# Patient Record
Sex: FEMALE | Race: WHITE | URBAN - METROPOLITAN AREA
[De-identification: names, ages, dates, MRNs, and addresses within clinical notes are randomized per-mention and may not be internally consistent; named-entity substitution may affect disease eponyms.]

---

## 2017-02-28 ENCOUNTER — OFFICE VISIT (OUTPATIENT)
Dept: ORTHOPEDICS | Facility: OTHER | Age: 82
End: 2017-02-28
Attending: ORTHOPAEDIC SURGERY
Payer: COMMERCIAL

## 2017-02-28 VITALS
HEART RATE: 64 BPM | SYSTOLIC BLOOD PRESSURE: 104 MMHG | BODY MASS INDEX: 22.29 KG/M2 | DIASTOLIC BLOOD PRESSURE: 68 MMHG | TEMPERATURE: 96.7 F | RESPIRATION RATE: 18 BRPM | HEIGHT: 58 IN | OXYGEN SATURATION: 98 % | WEIGHT: 106.2 LBS

## 2017-02-28 DIAGNOSIS — M17.11 PRIMARY OSTEOARTHRITIS OF RIGHT KNEE: Primary | ICD-10-CM

## 2017-02-28 PROCEDURE — 99212 OFFICE O/P EST SF 10 MIN: CPT | Performed by: ORTHOPAEDIC SURGERY

## 2017-02-28 PROCEDURE — 99212 OFFICE O/P EST SF 10 MIN: CPT

## 2017-02-28 ASSESSMENT — PAIN SCALES - GENERAL: PAINLEVEL: NO PAIN (0)

## 2017-02-28 NOTE — PROGRESS NOTES
Chief complaint: Severe DJD right knee    Subjective: This 81-year-old retired female has severe DJD of both knees worse and severely symptomatic on the right, but minimally symptomatically on the left.  Up to now she has wished to avoid surgery, if possible.  We therefore have been treating her conservatively with quarterly intra-articular steroid injections in the right knee, and OTC naproxen.  Her last injection was 3 months ago.  Today she reports that it only gave her about a week of pain relief.  She is having increasing difficulty getting up from a seated position.  She lives alone.  She denies a recent injury to the right knee.    She has a history of mental status changes with anesthesia and with pain medication.  Her last major surgery, performed in [place], resulted in prolonged mental status changes and she was almost transferred to an Alzheimer's unit before she started to clear.  Although she lives alone, she has 1 daughter nearby who states that the whole situation was extremely stressful on both her mother and herself and that she does not feel that she can go through another ordeal like that with her mother.    Examination: Elderly female with appropriate mood and affect.  The right knee may have a mild effusion.  It is tender to palpation along both joint lines. It was with great difficulty that she was able to get up from a seated position    Xx-rays: The last plain films on her right  Knee were performed on 8/4/16    Impression: Severe DJD right knee.    Plan: We discussed repeating the steroid injection but it seems futile given its prior efficacy.  We discussed a wheelchair, but the patient said she wouldn't use it. We also discussed the possibility of total knee arthroplasty, perhaps done under a spinal anesthetic, with inpatient rehabilitation afterwards.  She would still be at risk of mental status changes, however, from the postoperative pain medication.  The patient indicated that she has  changed her mind about avoiding surgery and would be interested in having the surgery done.  Her daughter admits that the injections are not helping but is very fearful about the prolonged mental status changes that may occur.  They agreed to hold off on injection today, sense a  TKA cannot be performed within 6 months of a steroid injection, and attend the next joint replacement class to learn more about the procedure.  I will see her back after the class to discuss the possibility of surgery further.

## 2017-02-28 NOTE — NURSING NOTE
"Chief Complaint   Patient presents with     Musculoskeletal Problem     Right knee pain       Initial /68 (BP Location: Right arm, Patient Position: Chair, Cuff Size: Adult Regular)  Pulse 64  Temp 96.7  F (35.9  C) (Tympanic)  Resp 18  Ht 4' 10\" (1.473 m)  Wt 106 lb 3.2 oz (48.2 kg)  SpO2 98%  BMI 22.2 kg/m2 Estimated body mass index is 22.2 kg/(m^2) as calculated from the following:    Height as of this encounter: 4' 10\" (1.473 m).    Weight as of this encounter: 106 lb 3.2 oz (48.2 kg).  Medication Reconciliation: unable or not appropriate to perform   Gosia Amaya LPN      "

## 2017-02-28 NOTE — MR AVS SNAPSHOT
"              After Visit Summary   2/28/2017    Shivani Ramírez    MRN: [id number]           Patient Information     Date Of Birth          1935        Visit Information        Provider Department      2/28/2017 11:20 AM Claudio Elizabeth MD [facility]        Today's Diagnoses     Primary osteoarthritis of right knee    -  1      Care Instructions    Attend the joint replacement class the [facility] on March 15        Follow-ups after your visit        Follow-up notes from your care team     Return in about 3 weeks (around 3/21/2017).      Your next 10 appointments already scheduled     Mar 21, 2017  9:20 AM CDT   (Arrive by 9:05 AM)   Return Visit with Claudio Elizabeth MD   [facility] ([facility] )    [address] [address] [phone]              Who to contact     If you have questions or need follow up information about today's clinic visit or your schedule please contact [facility] directly at [phone].  Normal or non-critical lab and imaging results will be communicated to you by MyChart, letter or phone within 4 business days after the clinic has received the results. If you do not hear from us within 7 days, please contact the clinic through [employer] Exchange[employer] or phone. If you have a critical or abnormal lab result, we will notify you by phone as soon as possible.  Submit refill requests through [employer] or call your pharmacy and they will forward the refill request to us. Please allow 3 business days for your refill to be completed.          Additional Information About Your Visit        Axial Exchangehart Information     [employer] lets you send messages to your doctor, view your test results, renew your prescriptions, schedule appointments and more. To sign up, go to [email or web address] . Click on \"Log in\" on the left side of the screen, which will take you to the Welcome page. Then click on \"Sign up Now\" on the right " "side of the page.     You will be asked to enter the access code listed below, as well as some personal information. Please follow the directions to create your username and password.     Your access code is: [email or web address]  Expires: 2017 12:32 PM     Your access code will  in 90 days. If you need help or a new code, please call your [facility] or [phone].        Care EveryWhere ID     This is your Care EveryWhere ID. This could be used by other organizations to access your [place] medical records  [id number]        Your Vitals Were     Pulse Temperature Respirations Height Pulse Oximetry BMI (Body Mass Index)    64 96.7  F (35.9  C) (Tympanic) 18 4' 10\" (1.473 m) 98% 22.2 kg/m2       Blood Pressure from Last 3 Encounters:   17 104/68   16 150/68   16 115/62    Weight from Last 3 Encounters:   17 106 lb 3.2 oz (48.2 kg)   16 103 lb (46.7 kg)   16 103 lb (46.7 kg)              Today, you had the following     No orders found for display       Primary Care Provider    [facility] Other Clinic                Thank you!     Thank you for choosing [facility]  for your care. Our goal is always to provide you with excellent care. Hearing back from our patients is one way we can continue to improve our services. Please take a few minutes to complete the written survey that you may receive in the mail after your visit with us. Thank you!             Your Updated Medication List - Protect others around you: Learn how to safely use, store and throw away your medicines at www.disposemymeds.org.          This list is accurate as of: 17 12:32 PM.  Always use your most recent med list.                   Brand Name Dispense Instructions for use    acetaminophen 325 MG tablet    TYLENOL     Take 650 mg by mouth       ASPIRIN PO      Take 162 mg by mouth daily       HYDROcodone-acetaminophen 5-325 MG per tablet    NORCO    30 tablet    Take 1 tablet by mouth every 4 " hours as needed for other (Moderate to Severe Pain)       IBUPROFEN PO      Take 200 mg by mouth 3 times daily as needed for moderate pain 3 Tablets, three times daily as needed       LISINOPRIL PO      Take 10 mg by mouth daily       LOPRESSOR PO      Take 25 mg by mouth 2 times daily Patient states is not sure of dose       meclizine 25 MG Chew      25 mg       metoprolol 25 MG 24 hr tablet    TOPROL-XL     Take 25 mg by mouth       multivitamin, therapeutic with minerals Tabs tablet      Take 1 tablet by mouth daily       OMEGA-3 FATTY ACIDS PO      Take 1,000 mg by mouth daily       OSTEO BI-FLEX JOINT SHIELD Tabs          PEPCID PO      Take 20 mg by mouth 2 times daily

## 2017-03-21 ENCOUNTER — OFFICE VISIT (OUTPATIENT)
Dept: ORTHOPEDICS | Facility: OTHER | Age: 82
End: 2017-03-21
Attending: ORTHOPAEDIC SURGERY
Payer: COMMERCIAL

## 2017-03-21 VITALS
HEIGHT: 58 IN | OXYGEN SATURATION: 98 % | RESPIRATION RATE: 18 BRPM | TEMPERATURE: 97.3 F | SYSTOLIC BLOOD PRESSURE: 128 MMHG | DIASTOLIC BLOOD PRESSURE: 68 MMHG | BODY MASS INDEX: 21.41 KG/M2 | WEIGHT: 102 LBS | HEART RATE: 62 BPM

## 2017-03-21 DIAGNOSIS — M17.11 PRIMARY OSTEOARTHRITIS OF RIGHT KNEE: Primary | ICD-10-CM

## 2017-03-21 DIAGNOSIS — Z01.810 PRE-OPERATIVE CARDIOVASCULAR EXAMINATION: ICD-10-CM

## 2017-03-21 LAB
ALBUMIN SERPL-MCNC: 3.9 G/DL (ref 3.4–5)
ALBUMIN UR-MCNC: NEGATIVE MG/DL
ALP SERPL-CCNC: 84 U/L (ref 40–150)
ALT SERPL W P-5'-P-CCNC: 21 U/L (ref 0–50)
ANION GAP SERPL CALCULATED.3IONS-SCNC: 7 MMOL/L (ref 3–14)
APPEARANCE UR: CLEAR
AST SERPL W P-5'-P-CCNC: 19 U/L (ref 0–45)
BASOPHILS # BLD AUTO: 0 10E9/L (ref 0–0.2)
BASOPHILS NFR BLD AUTO: 0.6 %
BILIRUB SERPL-MCNC: 0.5 MG/DL (ref 0.2–1.3)
BILIRUB UR QL STRIP: NEGATIVE
BUN SERPL-MCNC: 17 MG/DL (ref 7–30)
CALCIUM SERPL-MCNC: 10 MG/DL (ref 8.5–10.1)
CHLORIDE SERPL-SCNC: 103 MMOL/L (ref 94–109)
CO2 SERPL-SCNC: 29 MMOL/L (ref 20–32)
COLOR UR AUTO: YELLOW
CREAT SERPL-MCNC: 0.87 MG/DL (ref 0.52–1.04)
DIFFERENTIAL METHOD BLD: ABNORMAL
EOSINOPHIL # BLD AUTO: 0.1 10E9/L (ref 0–0.7)
EOSINOPHIL NFR BLD AUTO: 1 %
ERYTHROCYTE [DISTWIDTH] IN BLOOD BY AUTOMATED COUNT: 12.9 % (ref 10–15)
GFR SERPL CREATININE-BSD FRML MDRD: 63 ML/MIN/1.7M2
GLUCOSE SERPL-MCNC: 81 MG/DL (ref 70–99)
GLUCOSE UR STRIP-MCNC: NEGATIVE MG/DL
HCT VFR BLD AUTO: 38.3 % (ref 35–47)
HGB BLD-MCNC: 12.9 G/DL (ref 11.7–15.7)
HGB UR QL STRIP: ABNORMAL
KETONES UR STRIP-MCNC: NEGATIVE MG/DL
LEUKOCYTE ESTERASE UR QL STRIP: ABNORMAL
LYMPHOCYTES # BLD AUTO: 1.4 10E9/L (ref 0.8–5.3)
LYMPHOCYTES NFR BLD AUTO: 27.3 %
MCH RBC QN AUTO: 28.9 PG (ref 26.5–33)
MCHC RBC AUTO-ENTMCNC: 33.7 G/DL (ref 31.5–36.5)
MCV RBC AUTO: 86 FL (ref 78–100)
MONOCYTES # BLD AUTO: 0.4 10E9/L (ref 0–1.3)
MONOCYTES NFR BLD AUTO: 8.6 %
NEUTROPHILS # BLD AUTO: 3.1 10E9/L (ref 1.6–8.3)
NEUTROPHILS NFR BLD AUTO: 62.5 %
NITRATE UR QL: NEGATIVE
PH UR STRIP: 6.5 PH (ref 5–7)
PLATELET # BLD AUTO: 97 10E9/L (ref 150–450)
POTASSIUM SERPL-SCNC: 5 MMOL/L (ref 3.4–5.3)
PROT SERPL-MCNC: 7.1 G/DL (ref 6.8–8.8)
RBC # BLD AUTO: 4.47 10E12/L (ref 3.8–5.2)
RBC #/AREA URNS AUTO: NORMAL /HPF (ref 0–2)
SODIUM SERPL-SCNC: 139 MMOL/L (ref 133–144)
SP GR UR STRIP: 1.01 (ref 1–1.03)
URN SPEC COLLECT METH UR: ABNORMAL
UROBILINOGEN UR STRIP-ACNC: 0.2 EU/DL (ref 0.2–1)
WBC # BLD AUTO: 5 10E9/L (ref 4–11)
WBC #/AREA URNS AUTO: NORMAL /HPF (ref 0–2)

## 2017-03-21 PROCEDURE — 85025 COMPLETE CBC W/AUTO DIFF WBC: CPT | Performed by: ORTHOPAEDIC SURGERY

## 2017-03-21 PROCEDURE — 81001 URINALYSIS AUTO W/SCOPE: CPT | Performed by: ORTHOPAEDIC SURGERY

## 2017-03-21 PROCEDURE — 87086 URINE CULTURE/COLONY COUNT: CPT | Performed by: ORTHOPAEDIC SURGERY

## 2017-03-21 PROCEDURE — 87081 CULTURE SCREEN ONLY: CPT | Mod: 59 | Performed by: ORTHOPAEDIC SURGERY

## 2017-03-21 PROCEDURE — 80053 COMPREHEN METABOLIC PANEL: CPT | Performed by: ORTHOPAEDIC SURGERY

## 2017-03-21 PROCEDURE — 36415 COLL VENOUS BLD VENIPUNCTURE: CPT | Performed by: ORTHOPAEDIC SURGERY

## 2017-03-21 PROCEDURE — 99212 OFFICE O/P EST SF 10 MIN: CPT

## 2017-03-21 PROCEDURE — 99213 OFFICE O/P EST LOW 20 MIN: CPT | Performed by: ORTHOPAEDIC SURGERY

## 2017-03-21 ASSESSMENT — PAIN SCALES - GENERAL: PAINLEVEL: MODERATE PAIN (5)

## 2017-03-21 NOTE — MR AVS SNAPSHOT
"              After Visit Summary   3/21/2017    Shivani Ramírez    MRN: [id number]           Patient Information     Date Of Birth          1935        Visit Information        Provider Department      3/21/2017 9:20 AM Claudio Elizabeth MD [facility]        Today's Diagnoses     Primary osteoarthritis of right knee    -  1    Pre-operative cardiovascular examination           Follow-ups after your visit        Follow-up notes from your care team     Return in about 4 weeks (around 4/18/2017).      Your next 10 appointments already scheduled     May 09, 2017  9:00 AM CDT   (Arrive by 8:45 AM)   Return Visit with Claudio Elizabeth MD   [facility] ([facility] )    [address] [address] [phone]              Who to contact     If you have questions or need follow up information about today's clinic visit or your schedule please contact [facility] directly at [phone].  Normal or non-critical lab and imaging results will be communicated to you by Modacruzhart, letter or phone within 4 business days after the clinic has received the results. If you do not hear from us within 7 days, please contact the clinic through myMedScoret or phone. If you have a critical or abnormal lab result, we will notify you by phone as soon as possible.  Submit refill requests through [employer] or call your pharmacy and they will forward the refill request to us. Please allow 3 business days for your refill to be completed.          Additional Information About Your Visit        MyChart Information     [employer] lets you send messages to your doctor, view your test results, renew your prescriptions, schedule appointments and more. To sign up, go to [email or web address] . Click on \"Log in\" on the left side of the screen, which will take you to the Welcome page. Then click on \"Sign up Now\" on the right side of the page.     You will be asked to " "enter the access code listed below, as well as some personal information. Please follow the directions to create your username and password.     Your access code is: [email or web address]  Expires: 2017  1:32 PM     Your access code will  in 90 days. If you need help or a new code, please call your [place] clinic or [phone].        Care EveryWhere ID     This is your Care EveryWhere ID. This could be used by other organizations to access your [place] medical records  [id number]        Your Vitals Were     Pulse Temperature Respirations Height Pulse Oximetry BMI (Body Mass Index)    62 97.3  F (36.3  C) (Tympanic) 18 4' 10\" (1.473 m) 98% 21.32 kg/m2       Blood Pressure from Last 3 Encounters:   17 128/68   17 104/68   16 150/68    Weight from Last 3 Encounters:   17 102 lb (46.3 kg)   17 106 lb 3.2 oz (48.2 kg)   16 103 lb (46.7 kg)              We Performed the Following     *UA reflex to Microscopic and Culture     CBC with platelets and differential     Comprehensive metabolic panel     Methicillin resistant staph aureus cult     Urine Culture Aerobic Bacterial        Primary Care Provider    Md Other Clinic                Thank you!     Thank you for choosing [facility]  for your care. Our goal is always to provide you with excellent care. Hearing back from our patients is one way we can continue to improve our services. Please take a few minutes to complete the written survey that you may receive in the mail after your visit with us. Thank you!             Your Updated Medication List - Protect others around you: Learn how to safely use, store and throw away your medicines at www.disposemymeds.org.          This list is accurate as of: 3/21/17 10:24 AM.  Always use your most recent med list.                   Brand Name Dispense Instructions for use    acetaminophen 325 MG tablet    TYLENOL     Take 650 mg by mouth       ASPIRIN PO      Take 162 mg by " mouth daily       HYDROcodone-acetaminophen 5-325 MG per tablet    NORCO    30 tablet    Take 1 tablet by mouth every 4 hours as needed for other (Moderate to Severe Pain)       IBUPROFEN PO      Take 200 mg by mouth 3 times daily as needed for moderate pain 3 Tablets, three times daily as needed       LISINOPRIL PO      Take 10 mg by mouth daily       LOPRESSOR PO      Take 25 mg by mouth 2 times daily Patient states is not sure of dose       meclizine 25 MG Chew      25 mg       metoprolol 25 MG 24 hr tablet    TOPROL-XL     Take 25 mg by mouth       multivitamin, therapeutic with minerals Tabs tablet      Take 1 tablet by mouth daily       OMEGA-3 FATTY ACIDS PO      Take 1,000 mg by mouth daily       OSTEO BI-FLEX JOINT SHIELD Tabs          PEPCID PO      Take 20 mg by mouth 2 times daily

## 2017-03-21 NOTE — PROGRESS NOTES
Chief complaint: Severe DJD  Right knee    Subjective: This 81-year-old retired female has severe DJD of both knees, severely symptomatic on the right and minimally symptomatic on the left.  Conservative treatment up to now ( which exceeds 3 months) has consisted of oral NSAIDs (which no longer work) and quarterly steroid injections.  Her last injection was on 11/8/16 and did not help.  Her right knee pain interferes with activities of daily living such as the distance she can walk and the number stairs she can climb.  She has started using a cane in her home.  She has been trying to avoid surgery but has changed her mind and is now willing to consider TKA.  Her big concern is that she has a history of mental status changes with general anesthesia, which take a long time to clear.  She has less trouble with narcotic pain medication.  She therefore wishes to have her surgery done under a spinal anesthetic, then use as little postoperative narcotics as possible.  Since seen last on 2/28/17 she has attended our total joint class.  She found it very informative.  Based on what she learned at the class she wishes to proceed with planning a right TKA under spinal anesthesia.    Nothing has changed with respect to her musculoskeletal or neurologic review of systems since seen last.    Examination: Elderly female with appropriate mood and affect.  The right knee has no effusion.  It is tender along both joint lines.  Range of motion was 0-119  with pain at both extremes.  The knee is stable to ligamentous stressing.  The neurovascular status of that limb is intact.    Labs: Today her H&H is 12.9 and 38.3.  A UA is negative. BUN 17, Creatinine 0.87  ALT 21  AST 19  Albumen 3.9   Total Protein 7.1    Impression: Severe DJD right knee, failure of conservative treatment.    Plan: We will plan a TKA in may, once it has been 6 months since her last steroid injection.  She will see her PCP in April for preoperative medical  clearance.  I will see her after that for consent signing and scheduling a surgery date.     CC: Richelle Kent MD, [place], [place]

## 2017-03-21 NOTE — NURSING NOTE
"Chief Complaint   Patient presents with     Musculoskeletal Problem     right knee pain       Initial /68 (BP Location: Right arm, Patient Position: Chair, Cuff Size: Adult Regular)  Pulse 62  Temp 97.3  F (36.3  C) (Tympanic)  Resp 18  Ht 4' 10\" (1.473 m)  Wt 102 lb (46.3 kg)  SpO2 98%  BMI 21.32 kg/m2 Estimated body mass index is 21.32 kg/(m^2) as calculated from the following:    Height as of this encounter: 4' 10\" (1.473 m).    Weight as of this encounter: 102 lb (46.3 kg).  Medication Reconciliation: unable or not appropriate to perform   Gosia Amaya LPN      "

## 2017-03-23 LAB
BACTERIA SPEC CULT: NORMAL
MICRO REPORT STATUS: NORMAL
SPECIMEN SOURCE: NORMAL

## 2017-03-24 LAB
BACTERIA SPEC CULT: ABNORMAL
MICRO REPORT STATUS: ABNORMAL
SPECIMEN SOURCE: ABNORMAL

## 2017-05-01 ENCOUNTER — TRANSFERRED RECORDS (OUTPATIENT)
Dept: HEALTH INFORMATION MANAGEMENT | Facility: HOSPITAL | Age: 82
End: 2017-05-01

## 2017-05-03 ENCOUNTER — HOSPITAL ENCOUNTER (INPATIENT)
Facility: HOSPITAL | Age: 82
Setting detail: SURGERY ADMIT
End: 2017-05-03
Attending: ORTHOPAEDIC SURGERY | Admitting: ORTHOPAEDIC SURGERY
Payer: COMMERCIAL

## 2017-05-03 DIAGNOSIS — M25.561 CHRONIC PAIN OF RIGHT KNEE: Primary | ICD-10-CM

## 2017-05-03 DIAGNOSIS — G89.29 CHRONIC PAIN OF RIGHT KNEE: Primary | ICD-10-CM

## 2017-05-03 DIAGNOSIS — M17.11 ARTHRITIS OF RIGHT KNEE: Primary | ICD-10-CM

## 2017-05-09 ENCOUNTER — TELEPHONE (OUTPATIENT)
Dept: ORTHOPEDICS | Facility: OTHER | Age: 82
End: 2017-05-09

## 2017-05-09 NOTE — TELEPHONE ENCOUNTER
"Writer reached out to patient due to not being here for appointment today and patient stated \" I thought it was on Thursday\" patient advised writer that she is feeling better and she is walking better and that her daughter just had surgery and was unable to bring her to clinic today and that she will have to go and help take care of her, patient was offered an appointment on Thursday and patient declined, writer advised patient that I will take her off surgery schedule and to contact our office when she feels ready to continue with surgery, Patient advised she was cleared for surgery and pre-op appointments for scheduled surgery's are only good for 30 days. Patient appeared to understand and continued to state \" I am walking better and feel I am doing okay at this time. Writer advised again we will wait for call if she is ready for surgery again.   Gosia Amaya, JELENA    "

## 2018-09-10 ENCOUNTER — HOSPITAL ENCOUNTER (EMERGENCY)
Facility: HOSPITAL | Age: 83
Discharge: HOME | End: 2018-09-10
Attending: PHYSICIAN ASSISTANT | Admitting: PHYSICIAN ASSISTANT
Payer: COMMERCIAL

## 2018-09-10 VITALS
OXYGEN SATURATION: 99 % | DIASTOLIC BLOOD PRESSURE: 66 MMHG | RESPIRATION RATE: 16 BRPM | TEMPERATURE: 97.4 F | SYSTOLIC BLOOD PRESSURE: 151 MMHG

## 2018-09-10 DIAGNOSIS — S90.32XA CONTUSION OF LEFT FOOT, INITIAL ENCOUNTER: ICD-10-CM

## 2018-09-10 DIAGNOSIS — D69.2 PURPURA (H): ICD-10-CM

## 2018-09-10 LAB
ANION GAP SERPL CALCULATED.3IONS-SCNC: 3 MMOL/L (ref 3–14)
BASOPHILS # BLD AUTO: 0 10E9/L (ref 0–0.2)
BASOPHILS NFR BLD AUTO: 0.4 %
BUN SERPL-MCNC: 14 MG/DL (ref 7–30)
CALCIUM SERPL-MCNC: 9.4 MG/DL (ref 8.5–10.1)
CHLORIDE SERPL-SCNC: 106 MMOL/L (ref 94–109)
CO2 SERPL-SCNC: 31 MMOL/L (ref 20–32)
CREAT SERPL-MCNC: 0.82 MG/DL (ref 0.52–1.04)
DIFFERENTIAL METHOD BLD: ABNORMAL
EOSINOPHIL # BLD AUTO: 0 10E9/L (ref 0–0.7)
EOSINOPHIL NFR BLD AUTO: 0.4 %
ERYTHROCYTE [DISTWIDTH] IN BLOOD BY AUTOMATED COUNT: 13.1 % (ref 10–15)
GFR SERPL CREATININE-BSD FRML MDRD: 67 ML/MIN/1.7M2
GLUCOSE SERPL-MCNC: 97 MG/DL (ref 70–99)
HCT VFR BLD AUTO: 40 % (ref 35–47)
HGB BLD-MCNC: 13.5 G/DL (ref 11.7–15.7)
IMM GRANULOCYTES # BLD: 0 10E9/L (ref 0–0.4)
IMM GRANULOCYTES NFR BLD: 0.4 %
LYMPHOCYTES # BLD AUTO: 1.2 10E9/L (ref 0.8–5.3)
LYMPHOCYTES NFR BLD AUTO: 22.4 %
MCH RBC QN AUTO: 29 PG (ref 26.5–33)
MCHC RBC AUTO-ENTMCNC: 33.8 G/DL (ref 31.5–36.5)
MCV RBC AUTO: 86 FL (ref 78–100)
MONOCYTES # BLD AUTO: 0.3 10E9/L (ref 0–1.3)
MONOCYTES NFR BLD AUTO: 6.6 %
NEUTROPHILS # BLD AUTO: 3.6 10E9/L (ref 1.6–8.3)
NEUTROPHILS NFR BLD AUTO: 69.8 %
NRBC # BLD AUTO: 0 10*3/UL
NRBC BLD AUTO-RTO: 0 /100
PLATELET # BLD AUTO: 107 10E9/L (ref 150–450)
POTASSIUM SERPL-SCNC: 3.8 MMOL/L (ref 3.4–5.3)
RBC # BLD AUTO: 4.66 10E12/L (ref 3.8–5.2)
SODIUM SERPL-SCNC: 140 MMOL/L (ref 133–144)
WBC # BLD AUTO: 5.1 10E9/L (ref 4–11)

## 2018-09-10 PROCEDURE — G0463 HOSPITAL OUTPT CLINIC VISIT: HCPCS

## 2018-09-10 PROCEDURE — 85025 COMPLETE CBC W/AUTO DIFF WBC: CPT | Performed by: PHYSICIAN ASSISTANT

## 2018-09-10 PROCEDURE — 80048 BASIC METABOLIC PNL TOTAL CA: CPT | Performed by: PHYSICIAN ASSISTANT

## 2018-09-10 PROCEDURE — 99203 OFFICE O/P NEW LOW 30 MIN: CPT | Performed by: PHYSICIAN ASSISTANT

## 2018-09-10 PROCEDURE — 36415 COLL VENOUS BLD VENIPUNCTURE: CPT | Performed by: PHYSICIAN ASSISTANT

## 2018-09-10 ASSESSMENT — ENCOUNTER SYMPTOMS
COLOR CHANGE: 1
APPETITE CHANGE: 0
PSYCHIATRIC NEGATIVE: 1
NEUROLOGICAL NEGATIVE: 1
SHORTNESS OF BREATH: 0
FEVER: 0
FATIGUE: 0

## 2018-09-10 NOTE — ED AVS SNAPSHOT
[facility]    [address] [address]    Phone:  [phone]                                       Shivani Ramírez   MRN: [id number]    Department:  [facility]   Date of Visit:  9/10/2018           Patient Information     Date Of Birth          1935        Your diagnoses for this visit were:     Purpura (H) PLT count 107  Stop Baby Aspirin    Contusion of left foot, initial encounter        You were seen by Keyana Beckford PA.      Follow-up Information     Please follow up.    Why:  If symptoms worsen, with your new Primary Care Provider        Follow up with [facility].    Specialty:  EMERGENCY MEDICINE    Why:  If further concerns develop    Contact information:    [address] [address] [phone]    Additional information:    From [facility]: Take [address]. Turn left at [address]. Turn left at the first stoplight on [address]. At the first stop sign, take a right onto [address]. Take a left into the parking lot and continue through until you reach the North enterance of the building.       From [place]: Take [address]. Take the [address] ramp towards [place]. Turn left onto [address]. Take a slight right onto [address]. Turn left at the first stoplight on [address]. At the first stop sign, take a right onto [address]. Take a left into the parking lot and continue through until you reach the North enterance of the building.       From [place]: Take [address]. Take a right at [address]. At the first stop sign, take a right onto [address]. Take a left into the parking lot and continue through until you reach the North enterance of the building.         Discharge Instructions       Stop taking the Baby Aspirin.    Discharge References/Attachments     LOWER EXTREMITY CONTUSION (ENGLISH)         Review of your  medicines      Our records show that you are taking the medicines listed below. If these are incorrect, please call your family doctor or clinic.        Dose / Directions Last dose taken    acetaminophen 325 MG tablet   Commonly known as:  TYLENOL   Dose:  650 mg        Take 650 mg by mouth   Refills:  0        ASPIRIN PO   Dose:  162 mg        Take 162 mg by mouth daily   Refills:  0        HYDROcodone-acetaminophen 5-325 MG per tablet   Commonly known as:  NORCO   Dose:  1 tablet   Quantity:  30 tablet        Take 1 tablet by mouth every 4 hours as needed for other (Moderate to Severe Pain)   Refills:  0        IBUPROFEN PO   Dose:  200 mg        Take 200 mg by mouth 3 times daily as needed for moderate pain 3 Tablets, three times daily as needed   Refills:  0        LISINOPRIL PO   Dose:  10 mg        Take 10 mg by mouth daily   Refills:  0        LOPRESSOR PO   Dose:  25 mg        Take 25 mg by mouth 2 times daily Patient states is not sure of dose   Refills:  0        meclizine 25 MG Chew   Dose:  25 mg        25 mg   Refills:  0        metoprolol succinate 25 MG 24 hr tablet   Commonly known as:  TOPROL-XL   Dose:  25 mg        Take 25 mg by mouth   Refills:  0        multivitamin, therapeutic with minerals Tabs tablet   Dose:  1 tablet        Take 1 tablet by mouth daily   Refills:  0        OMEGA-3 FATTY ACIDS PO   Dose:  1000 mg        Take 1,000 mg by mouth daily   Refills:  0        OSTEO BI-FLEX JOINT SHIELD Tabs        Refills:  0        PEPCID PO   Dose:  20 mg        Take 20 mg by mouth 2 times daily   Refills:  0                Procedures and tests performed during your visit     Basic metabolic panel    CBC with platelets differential      Orders Needing Specimen Collection     None      Pending Results     No orders found from 9/8/2018 to 9/11/2018.            Pending Culture Results     No orders found from 9/8/2018 to 9/11/2018.            Thank you for choosing [facility]       Thank you for  "choosing [facility] for your care. Our goal is always to provide you with excellent care. Hearing back from our patients is one way we can continue to improve our services. Please take a few minutes to complete the written survey that you may receive in the mail after you visit with us. Thank you!        [employer] Information     [employer] lets you send messages to your doctor, view your test results, renew your prescriptions, schedule appointments and more. To sign up, go to [email or web address] . Click on \"Log in\" on the left side of the screen, which will take you to the Welcome page. Then click on \"Sign up Now\" on the right side of the page.     You will be asked to enter the access code listed below, as well as some personal information. Please follow the directions to create your username and password.     Your access code is: [email or web address]  Expires: 2018  5:43 PM     Your access code will  in 90 days. If you need help or a new code, please call your [place] [facility] clinic or [phone].        Care EveryWhere ID     This is your Care EveryWhere ID. This could be used by other organizations to access your [facility] medical records  [id number]        Equal Access to Services     ADITYA BALLARD : Lakhwinder Guajardo, radha slater, jairo murray, selvin hester. So [facility] [phone].    ATENCIÓN: Si habla español, tiene a murillo disposición servicios gratuitos de asistencia lingüística. Llame al [phone].    We comply with applicable federal civil rights laws and [place] laws. We do not discriminate on the basis of race, color, national origin, age, disability, sex, sexual orientation, or gender identity.            After Visit Summary       This is your record. Keep this with you and show to your community pharmacist(s) and doctor(s) at your next visit.                  "

## 2018-09-10 NOTE — ED AVS SNAPSHOT
[facility]    [address] [address]    Phone:  [phone]                                       Shivani Ramírez   MRN: [id number]    Department:  [facility]   Date of Visit:  9/10/2018           After Visit Summary Signature Page     I have received my discharge instructions, and my questions have been answered. I have discussed any challenges I see with this plan with the nurse or doctor.    ..........................................................................................................................................  Patient/Patient Representative Signature      ..........................................................................................................................................  Patient Representative Print Name and Relationship to Patient    ..................................................               ................................................  Date                                            Time    ..........................................................................................................................................  Reviewed by Signature/Title    ...................................................              ..............................................  Date                                                            Time          22EPIC Rev 08/18

## 2018-09-10 NOTE — ED NOTES
Patient presents with bruise to Lt lower leg.  NKI.  Issue is stated to be chronic, patient is suppose to wear support hose.

## 2018-09-10 NOTE — ED PROVIDER NOTES
"  History     Chief Complaint   Patient presents with     other     lt lower leg bruising, denies injury and pain with squeezing, family notes has been seen several times and was told its a chronic condition and to wear support socks. notes waiting to be seen in [place] today but her dtr got \"vocal\" and they called security.     The history is provided by the patient and a relative. No  was used.     Shivani Ramírez is a 82 year old female who \"presents with bruise to Lt lower leg.  NKI.  Issue is stated to be chronic, patient is suppose to wear support hose.\" the bruised area on her lower leg does not hurt. no chest pain, no difficulty breathing no SOB. No n/v/d/f/c. No decrease in energy/appetite    The pt has been seen multiple times at her primary clinic for this issue.  On 25 July 2018 pt was seen due to red spots forming on her arms and legs. she had labs done, PLT count 115 (This is up from the last one at 95).  Seen again on 1 Aug 2018, the area on her shin was getting bigger. was told to stop the baby ASA and a bx was done. Pt was called on 06 August 2018 for the results- purpura.    Pt states a few days ago a screen fell on her left foot and has bruising there, but this does not bother her.     Problem List:    Patient Active Problem List    Diagnosis Date Noted     ACP (advance care planning) 08/09/2016     Priority: Medium     Advance Care Planning 8/9/2016: ACP Review of Chart / Resources Provided:  Reviewed chart for advance care plan.  Shivani Ramírez has no plan or code status on file however states presence of ACP document. Copy requested. Confirmed code status reflects current choices pending receipt of document/advance care plan review.  Confirmed/documented legally designated decision makers.  Added by Gosia Amaya             Pre-op exam 03/14/2016     Priority: Medium     Right shoulder ganglion cyst       Benign essential hypertension 03/14/2016     Priority: " Medium        Past Medical History:    No past medical history on file.    Past Surgical History:    Past Surgical History:   Procedure Laterality Date     AS FIX RECTAL PROLAPSE,PERINEAL APPR N/A      AS REPR VAGINAL PROLAPSE,SACROSP LIG       COLONOSCOPY - HIM SCAN  10/31/2012    Dr. JAYESH Paiz at [facility] - normal     GYN SURGERY      hysterectomy     HERNIA REPAIR       RESECT CLAVICLE DISTAL Right 3/24/2016    Procedure: RESECT CLAVICLE DISTAL;  Surgeon: Claudio Elizabeth MD;  Location: [facility] OR       Family History:    Family History   Problem Relation Age of Onset     Hypertension Mother      Coronary Artery Disease Mother      Breast Cancer Mother      Other Cancer Father      Diabetes Paternal Grandmother      Thyroid Disease Paternal Grandmother      Asthma Daughter        Social History:  Marital Status:   [5]  Social History   Substance Use Topics     Smoking status: Never Smoker     Smokeless tobacco: Not on file     Alcohol use Yes      Comment: occasional        Medications:      acetaminophen (TYLENOL) 325 MG tablet   ASPIRIN PO   Famotidine (PEPCID PO)   HYDROcodone-acetaminophen (NORCO) 5-325 MG per tablet   IBUPROFEN PO   LISINOPRIL PO   meclizine 25 MG CHEW   metoprolol (TOPROL-XL) 25 MG 24 hr tablet   Metoprolol Tartrate (LOPRESSOR PO)   Misc Natural Products (OSTEO BI-FLEX JOINT SHIELD) TABS   multivitamin, therapeutic with minerals (THERA-VIT-M) TABS   OMEGA-3 FATTY ACIDS PO         Review of Systems   Constitutional: Negative for appetite change, fatigue and fever.   HENT: Negative.    Respiratory: Negative for shortness of breath.    Cardiovascular: Negative for chest pain.   Skin: Positive for color change.   Neurological: Negative.    Psychiatric/Behavioral: Negative.        Physical Exam   BP: 151/66  Heart Rate: 58  Temp: 97.4  F (36.3  C)  Resp: 16  SpO2: 99 %      Physical Exam   Constitutional: She is oriented to person, place, and time. She appears well-developed and well-nourished.  No distress.   Cardiovascular: Normal rate.    Pulmonary/Chest: Effort normal.   Musculoskeletal:   Left shin area: area of purpura approx 4 cm x 6 cm. No edema/erythema. No TTP    Left foot: ecchymosis noted along medial side of the heel and foot. No TTP. +AFROM, 5/5 strength. Minimal edema. No erythema    I was able to show the pt and the daughter the difference between the two areas. I reassured the daughter.  The purpura is in the out layer of the skin, so not at risk of causing clots.   Neurological: She is alert and oriented to person, place, and time.   Skin: She is not diaphoretic.   Psychiatric: She has a normal mood and affect.   Nursing note and vitals reviewed.      ED Course     ED Course     Procedures              Results for orders placed or performed during the hospital encounter of 09/10/18 (from the past 24 hour(s))   CBC with platelets differential   Result Value Ref Range    WBC 5.1 4.0 - 11.0 10e9/L    RBC Count 4.66 3.8 - 5.2 10e12/L    Hemoglobin 13.5 11.7 - 15.7 g/dL    Hematocrit 40.0 35.0 - 47.0 %    MCV 86 78 - 100 fl    MCH 29.0 26.5 - 33.0 pg    MCHC 33.8 31.5 - 36.5 g/dL    RDW 13.1 10.0 - 15.0 %    Platelet Count 107 (L) 150 - 450 10e9/L    Diff Method Automated Method     % Neutrophils 69.8 %    % Lymphocytes 22.4 %    % Monocytes 6.6 %    % Eosinophils 0.4 %    % Basophils 0.4 %    % Immature Granulocytes 0.4 %    Nucleated RBCs 0 0 /100    Absolute Neutrophil 3.6 1.6 - 8.3 10e9/L    Absolute Lymphocytes 1.2 0.8 - 5.3 10e9/L    Absolute Monocytes 0.3 0.0 - 1.3 10e9/L    Absolute Eosinophils 0.0 0.0 - 0.7 10e9/L    Absolute Basophils 0.0 0.0 - 0.2 10e9/L    Abs Immature Granulocytes 0.0 0 - 0.4 10e9/L    Absolute Nucleated RBC 0.0    Basic metabolic panel   Result Value Ref Range    Sodium 140 133 - 144 mmol/L    Potassium 3.8 3.4 - 5.3 mmol/L    Chloride 106 94 - 109 mmol/L    Carbon Dioxide 31 20 - 32 mmol/L    Anion Gap 3 3 - 14 mmol/L    Glucose 97 70 - 99 mg/dL    Urea Nitrogen  "14 7 - 30 mg/dL    Creatinine 0.82 0.52 - 1.04 mg/dL    GFR Estimate 67 >60 mL/min/1.7m2    GFR Estimate If Black 81 >60 mL/min/1.7m2    Calcium 9.4 8.5 - 10.1 mg/dL     Case Report Surgical Pathology Report                         Case: [id number]                                 Authorizing Provider:  Richelle Sanabria MD         Collected:           08/01/2018 1524              Ordering Location:     [facility]   Received:            08/01/2018 1549                                     [facility]                                                       Pathologist:           Mauricio Luz MD                                                    Specimen:    Leg Left                                                                                    Final Dx Skin, left leg, punch biopsy: Purpura simplex.     Gross Description Received is one specimen container, specimen in formalin, labeled with proper patient identification.  Designated \"left leg\" is a 0.2 cm tan skin punch.  Inked green, submitted intact, entirely in A1.  Concurrent DIF case, [id number].  DMT     Microscopic The slide is punch biopsy fragments of skin which have a superficial region of erythrocyte extravasation with dermal solar elastosis. There is no significant associated inflammation. No vascular thrombosis is recognized.       PATHOLOGY SPEC (08/01/2018 3:24 PM)   Specimen Performing Laboratory   Tissue - Leg Left [facility]    [address] [place] [id number]           Assessments & Plan (with Medical Decision Making)     I have reviewed the nursing notes.    I have reviewed the findings, diagnosis, plan and need for follow up with the patient.          Final diagnoses:   Purpura (H) - PLT count 107  Stop Baby Aspirin   Contusion of left foot, initial encounter         Daughter much happier now.  Patient/daughter verbally educated and given appropriate education sheets for the diagnoses and " has no questions.  Discontinue the Baby ASA  Follow up with your Primary Care provider if symptoms increase or if further concerns develop, return to the ER  Keyana Beckford Certified  Physician Assistant  9/10/2018  6:17 PM  URGENT CARE CLINIC      9/10/2018   [facility]     Keyana Beckford PA  09/10/18 [id number]